# Patient Record
Sex: FEMALE | Race: WHITE | Employment: FULL TIME | URBAN - METROPOLITAN AREA
[De-identification: names, ages, dates, MRNs, and addresses within clinical notes are randomized per-mention and may not be internally consistent; named-entity substitution may affect disease eponyms.]

---

## 2019-03-12 ENCOUNTER — HOSPITAL ENCOUNTER (OUTPATIENT)
Dept: WOMENS IMAGING | Age: 64
Discharge: HOME | End: 2019-03-12
Payer: COMMERCIAL

## 2019-03-12 ENCOUNTER — HOSPITAL ENCOUNTER (OUTPATIENT)
Dept: ULTRASOUND IMAGING | Age: 64
Discharge: HOME | End: 2019-03-12
Payer: COMMERCIAL

## 2019-03-12 DIAGNOSIS — N63.0 LUMP OR MASS IN BREAST: ICD-10-CM

## 2019-03-12 DIAGNOSIS — N64.4 BREAST PAIN, LEFT: ICD-10-CM

## 2019-03-12 PROCEDURE — G0279 TOMOSYNTHESIS, MAMMO: HCPCS

## 2019-03-12 PROCEDURE — 76642 ULTRASOUND BREAST LIMITED: CPT

## 2022-01-09 ENCOUNTER — HOSPITAL ENCOUNTER (EMERGENCY)
Age: 67
Discharge: LEFT BEFORE DISCHARGE | End: 2022-01-09
Payer: COMMERCIAL

## 2022-01-09 VITALS
DIASTOLIC BLOOD PRESSURE: 75 MMHG | TEMPERATURE: 98.2 F | SYSTOLIC BLOOD PRESSURE: 128 MMHG | RESPIRATION RATE: 18 BRPM | HEART RATE: 88 BPM | OXYGEN SATURATION: 98 %

## 2022-01-09 DIAGNOSIS — Z53.21 ELOPED FROM EMERGENCY DEPARTMENT: Primary | ICD-10-CM

## 2022-01-09 PROCEDURE — 99283 EMERGENCY DEPT VISIT LOW MDM: CPT

## 2022-01-09 NOTE — ED PROVIDER NOTES
**ADVANCED PRACTICE PROVIDER, I HAVE EVALUATED THIS PATIENT**        [address] [facility]      Pt Name: Prachi Zhang  TDT:[id number]  [place] 1955  Date of evaluation: 1/9/2022  Provider: Yadiel Meek PA-C      Chief Complaint:    Chief Complaint   Patient presents with    Urinary Retention     Patient in with complaints of diff urinating. States she was dx with prolapse bladder         Nursing Notes, Past Medical Hx, Past Surgical Hx, Social Hx, Allergies, and Family Hx were all reviewed and agreed with or any disagreements were addressed in the HPI.    HPI: (Location, Duration, Timing, Severity, Quality, Assoc Sx, Context, Modifying factors)    Chief Complaint of urinary retention/difficulty urinating    This is a  77 y.o. female who presents to the emergency department stating that she has a prolapsed bladder and uterus and she has been having some difficulty urinating. She states that when she gets up in the morning after sleeping she is able to urinate however during the day becomes increasingly more difficult and she only urinates in small amounts. She has an appointment with her OB/GYN/pelvic  next Thursday. She states that over the last couple of days she has been taking care of her mother who has dementia and she has been up multiple times in the middle of the night, and states that she thinks that she is retaining urine. She states she is not having any pain, she just feels as if she is not able to urinate completely.     PastMedical/Surgical History:      Diagnosis Date    Hyperlipidemia     Hypertension     Thyroid disease          Procedure Laterality Date    JOINT REPLACEMENT      TOTAL HIP ARTHROPLASTY Bilateral        Medications:  Discharge Medication List as of 1/9/2022  2:01 PM      CONTINUE these medications which have NOT CHANGED    Details   levothyroxine (SYNTHROID) 75 MCG tablet R-1      meloxicam (MOBIC) 15 MG tablet Take 1 tablet by mouth daily For 10 days then daily when necessary thereafter, Disp-30 tablet, R-2      benazepril (LOTENSIN) 20 MG tablet Take 20 mg by mouth daily. Review of Systems:  (2-9 systems needed)  Review of Systems    \"Positives and Pertinent negatives as per HPI\"    Physical Exam:  Physical Exam  Vitals and nursing note reviewed. Constitutional:       Appearance: Normal appearance. She is well-developed. She is not ill-appearing or diaphoretic. HENT:      Head: Normocephalic and atraumatic. Right Ear: External ear normal.      Left Ear: External ear normal.      Nose: Nose normal.   Eyes:      General:         Right eye: No discharge. Left eye: No discharge. Pulmonary:      Effort: Pulmonary effort is normal. No respiratory distress. Breath sounds: No stridor. Genitourinary:     Labia:         Right: No rash, tenderness, lesion or injury. Left: No rash, tenderness, lesion or injury. Vagina: No foreign body. No vaginal discharge, erythema, tenderness or bleeding. Cervix: No cervical motion tenderness, discharge or friability. Uterus: Not tender. Adnexa:         Right: No mass, tenderness or fullness. Left: No mass, tenderness or fullness. Rectum: Normal.      Comments: Sitting and standing no palpable or visible prolapse  Musculoskeletal:         General: Normal range of motion. Cervical back: Normal range of motion and neck supple. Skin:     General: Skin is warm and dry. Coloration: Skin is not pale. Neurological:      General: No focal deficit present. Mental Status: She is alert and oriented to person, place, and time.    Psychiatric:         Mood and Affect: Mood normal.         Behavior: Behavior normal.         MEDICAL DECISION MAKING    Vitals:    Vitals:    01/09/22 1248   BP: 128/75   Pulse: 88   Resp: 18   Temp: 98.2 °F (36.8 °C)   TempSrc: Oral   SpO2: 98% LABS:  Labs Reviewed   URINE RT REFLEX TO CULTURE        Remainder of labs reviewed and were negative at this time or not returned at the time of this note. RADIOLOGY:   Non-plain film images such as CT, Ultrasound and MRI are read by the radiologist. Heike Bashir PA-C have directly visualized the radiologic plain film image(s) with the below findings:      Interpretation per the Radiologist below, if available at the time of this note:    No orders to display        No results found. MEDICAL DECISION MAKING / ED COURSE:      PROCEDURES:   Procedures    None    Patient was given:  Medications - No data to display    Taking the patient at her word we did a catheterized urine, there were only 26 cc of urine in the tube, we discontinued the Jose and I was going back to talk to the patient when I was informed by the nurse that the patient had left/eloped without a complete evaluation. We would have spoken about differential diagnosis including urinary tract infection versus interstitial cystitis, and I would have referred her to a urologist    The patient tolerated their visit well. I evaluated the patient. The physician was available for consultation as needed. The patient and / or the family were informed of the results of any tests, a time was given to answer questions, a plan was proposed and they agreed with plan. CLINICAL IMPRESSION:  No diagnosis found. DISPOSITION Eloped - Left Before Treatment Complete 01/09/2022 02:00:50 PM      PATIENT REFERRED TO:  No follow-up provider specified.     DISCHARGE MEDICATIONS:  Discharge Medication List as of 1/9/2022  2:01 PM          DISCONTINUED MEDICATIONS:  Discharge Medication List as of 1/9/2022  2:01 PM                 (Please note the MDM and HPI sections of this note were completed with a voice recognition program.  Efforts were made to edit the dictations but occasionally words are mis-transcribed.)    Electronically signed, Lizandro Santizo Shane [place], [place]  01/09/22 7203

## 2022-01-09 NOTE — ED NOTES
Pt states she needs to leave now because her mother has dementia and has no one to watch her. Provider aware.      Vargas Lindo RN  01/09/22 9339

## 2022-01-26 LAB — HPV, HIGH RISK: NOT DETECTED

## 2024-05-10 ENCOUNTER — APPOINTMENT (OUTPATIENT)
Dept: GENERAL RADIOLOGY | Age: 69
End: 2024-05-10
Payer: MEDICARE

## 2024-05-10 ENCOUNTER — HOSPITAL ENCOUNTER (EMERGENCY)
Age: 69
Discharge: HOME | End: 2024-05-10
Attending: EMERGENCY MEDICINE
Payer: MEDICARE

## 2024-05-10 ENCOUNTER — HOSPITAL ENCOUNTER (EMERGENCY)
Age: 69
Discharge: HOME | End: 2024-05-10
Payer: MEDICARE

## 2024-05-10 VITALS
TEMPERATURE: 97.8 F | SYSTOLIC BLOOD PRESSURE: 168 MMHG | HEART RATE: 72 BPM | HEIGHT: 63 IN | BODY MASS INDEX: 30.3 KG/M2 | WEIGHT: 171 LBS | DIASTOLIC BLOOD PRESSURE: 85 MMHG | OXYGEN SATURATION: 96 % | RESPIRATION RATE: 16 BRPM

## 2024-05-10 VITALS
OXYGEN SATURATION: 100 % | SYSTOLIC BLOOD PRESSURE: 159 MMHG | HEART RATE: 75 BPM | DIASTOLIC BLOOD PRESSURE: 74 MMHG | TEMPERATURE: 97.8 F | RESPIRATION RATE: 18 BRPM

## 2024-05-10 DIAGNOSIS — I10 ASYMPTOMATIC HYPERTENSION: Primary | ICD-10-CM

## 2024-05-10 DIAGNOSIS — R03.0 ELEVATED BLOOD PRESSURE READING: Primary | ICD-10-CM

## 2024-05-10 DIAGNOSIS — D72.829 LEUKOCYTOSIS, UNSPECIFIED TYPE: ICD-10-CM

## 2024-05-10 LAB
ALBUMIN SERPL-MCNC: 4.2 G/DL (ref 3.4–5)
ALBUMIN/GLOB SERPL: 1.2 {RATIO} (ref 1.1–2.2)
ALP SERPL-CCNC: 69 U/L (ref 40–129)
ALT SERPL-CCNC: 20 U/L (ref 10–40)
ANION GAP SERPL CALCULATED.3IONS-SCNC: 15 MMOL/L (ref 3–16)
AST SERPL-CCNC: 29 U/L (ref 15–37)
BACTERIA URNS QL MICRO: NORMAL /HPF
BASOPHILS # BLD: 0.1 K/UL (ref 0–0.2)
BASOPHILS NFR BLD: 0.3 %
BILIRUB SERPL-MCNC: <0.2 MG/DL (ref 0–1)
BILIRUB UR QL STRIP.AUTO: NEGATIVE
BUN SERPL-MCNC: 24 MG/DL (ref 7–20)
CALCIUM SERPL-MCNC: 9.7 MG/DL (ref 8.3–10.6)
CHLORIDE SERPL-SCNC: 102 MMOL/L (ref 99–110)
CLARITY UR: CLEAR
CO2 SERPL-SCNC: 19 MMOL/L (ref 21–32)
COLOR UR: YELLOW
CREAT SERPL-MCNC: 0.7 MG/DL (ref 0.6–1.2)
DEPRECATED RDW RBC AUTO: 14.3 % (ref 12.4–15.4)
EKG ATRIAL RATE: 66 BPM
EKG ATRIAL RATE: 75 BPM
EKG DIAGNOSIS: NORMAL
EKG DIAGNOSIS: NORMAL
EKG P AXIS: 10 DEGREES
EKG P AXIS: 9 DEGREES
EKG P-R INTERVAL: 144 MS
EKG P-R INTERVAL: 162 MS
EKG Q-T INTERVAL: 344 MS
EKG Q-T INTERVAL: 370 MS
EKG QRS DURATION: 70 MS
EKG QRS DURATION: 80 MS
EKG QTC CALCULATION (BAZETT): 384 MS
EKG QTC CALCULATION (BAZETT): 387 MS
EKG R AXIS: -3 DEGREES
EKG R AXIS: -9 DEGREES
EKG T AXIS: 48 DEGREES
EKG T AXIS: 53 DEGREES
EKG VENTRICULAR RATE: 66 BPM
EKG VENTRICULAR RATE: 75 BPM
EOSINOPHIL # BLD: 0 K/UL (ref 0–0.6)
EOSINOPHIL NFR BLD: 0 %
EPI CELLS #/AREA URNS AUTO: 2 /HPF (ref 0–5)
GFR SERPLBLD CREATININE-BSD FMLA CKD-EPI: >90 ML/MIN/{1.73_M2}
GLUCOSE BLD-MCNC: 157 MG/DL (ref 70–99)
GLUCOSE SERPL-MCNC: 109 MG/DL (ref 70–99)
GLUCOSE UR STRIP.AUTO-MCNC: NEGATIVE MG/DL
HCT VFR BLD AUTO: 39.5 % (ref 36–48)
HGB BLD-MCNC: 13.3 G/DL (ref 12–16)
HGB UR QL STRIP.AUTO: NEGATIVE
HYALINE CASTS #/AREA URNS AUTO: 0 /LPF (ref 0–8)
KETONES UR STRIP.AUTO-MCNC: NEGATIVE MG/DL
LEUKOCYTE ESTERASE UR QL STRIP.AUTO: ABNORMAL
LYMPHOCYTES # BLD: 1 K/UL (ref 1–5.1)
LYMPHOCYTES NFR BLD: 4.4 %
MCH RBC QN AUTO: 29.9 PG (ref 26–34)
MCHC RBC AUTO-ENTMCNC: 33.7 G/DL (ref 31–36)
MCV RBC AUTO: 88.9 FL (ref 80–100)
MONOCYTES # BLD: 1.1 K/UL (ref 0–1.3)
MONOCYTES NFR BLD: 4.9 %
NEUTROPHILS # BLD: 20.8 K/UL (ref 1.7–7.7)
NEUTROPHILS NFR BLD: 90.4 %
NITRITE UR QL STRIP.AUTO: NEGATIVE
PERFORMED ON: ABNORMAL
PH UR STRIP.AUTO: 6 [PH] (ref 5–8)
PLATELET # BLD AUTO: 342 K/UL (ref 135–450)
PMV BLD AUTO: 7.8 FL (ref 5–10.5)
POTASSIUM SERPL-SCNC: 5 MMOL/L (ref 3.5–5.1)
PROT SERPL-MCNC: 7.6 G/DL (ref 6.4–8.2)
PROT UR STRIP.AUTO-MCNC: NEGATIVE MG/DL
RBC # BLD AUTO: 4.44 M/UL (ref 4–5.2)
RBC CLUMPS #/AREA URNS AUTO: 0 /HPF (ref 0–4)
SODIUM SERPL-SCNC: 136 MMOL/L (ref 136–145)
SP GR UR STRIP.AUTO: 1.02 (ref 1–1.03)
TROPONIN, HIGH SENSITIVITY: 10 NG/L (ref 0–14)
UA COMPLETE W REFLEX CULTURE PNL UR: ABNORMAL
UA DIPSTICK W REFLEX MICRO PNL UR: YES
URN SPEC COLLECT METH UR: ABNORMAL
UROBILINOGEN UR STRIP-ACNC: 0.2 E.U./DL
WBC # BLD AUTO: 23 K/UL (ref 4–11)
WBC #/AREA URNS AUTO: 4 /HPF (ref 0–5)

## 2024-05-10 PROCEDURE — 93010 ELECTROCARDIOGRAM REPORT: CPT | Performed by: INTERNAL MEDICINE

## 2024-05-10 PROCEDURE — 93005 ELECTROCARDIOGRAM TRACING: CPT | Performed by: PHYSICIAN ASSISTANT

## 2024-05-10 PROCEDURE — 36415 COLL VENOUS BLD VENIPUNCTURE: CPT

## 2024-05-10 PROCEDURE — 99285 EMERGENCY DEPT VISIT HI MDM: CPT

## 2024-05-10 PROCEDURE — 93005 ELECTROCARDIOGRAM TRACING: CPT | Performed by: EMERGENCY MEDICINE

## 2024-05-10 PROCEDURE — 80053 COMPREHEN METABOLIC PANEL: CPT

## 2024-05-10 PROCEDURE — 81001 URINALYSIS AUTO W/SCOPE: CPT

## 2024-05-10 PROCEDURE — 99283 EMERGENCY DEPT VISIT LOW MDM: CPT

## 2024-05-10 PROCEDURE — 84484 ASSAY OF TROPONIN QUANT: CPT

## 2024-05-10 PROCEDURE — 71045 X-RAY EXAM CHEST 1 VIEW: CPT

## 2024-05-10 PROCEDURE — 85025 COMPLETE CBC W/AUTO DIFF WBC: CPT

## 2024-05-10 RX ORDER — CANDESARTAN 16 MG/1
16 TABLET ORAL DAILY
COMMUNITY

## 2024-05-10 ASSESSMENT — ENCOUNTER SYMPTOMS
ABDOMINAL PAIN: 0
SHORTNESS OF BREATH: 0
DIARRHEA: 0
COUGH: 0
CHEST TIGHTNESS: 0
NAUSEA: 0
DIARRHEA: 0
VOMITING: 0
RHINORRHEA: 0
WHEEZING: 0
NAUSEA: 0
ABDOMINAL PAIN: 0
VOMITING: 0
SHORTNESS OF BREATH: 0

## 2024-05-10 ASSESSMENT — PAIN - FUNCTIONAL ASSESSMENT
PAIN_FUNCTIONAL_ASSESSMENT: NONE - DENIES PAIN
PAIN_FUNCTIONAL_ASSESSMENT: 0-10

## 2024-05-10 ASSESSMENT — LIFESTYLE VARIABLES
HOW MANY STANDARD DRINKS CONTAINING ALCOHOL DO YOU HAVE ON A TYPICAL DAY: PATIENT DOES NOT DRINK
HOW OFTEN DO YOU HAVE A DRINK CONTAINING ALCOHOL: NEVER

## 2024-05-10 ASSESSMENT — PAIN SCALES - GENERAL: PAINLEVEL_OUTOF10: 0

## 2024-05-10 NOTE — ED PROVIDER NOTES
EKG  The Ekg interpreted by me shows  normal sinus rhythm with a rate of 66  Axis is   Normal  QTc is  normal  Intervals and Durations are unremarkable.      ST Segments: normal  No prior EKG available for comparison.       Jerry Gray MD  05/10/24 7599

## 2024-05-10 NOTE — ED PROVIDER NOTES
[facility] EMERGENCY DEPARTMENT  EMERGENCY DEPARTMENT ENCOUNTER        Pt Name: Myra Linton  MRN: [id number]  Birthdate 1955  Date of evaluation: 5/10/2024  Provider: JOSEPH Madrid CNP  PCP: Christine Perez MD  Note Started: 1:09 AM EDT 5/10/24       I have seen and evaluated this patient with my supervising physician Kiran Manzo MD.      CHIEF COMPLAINT       Chief Complaint   Patient presents with    Hypertension     PT states her BP was reading 170/105 at home. She took an extra dose of candesartan 16mg before coming to help. Brought in by [facility]       HISTORY OF PRESENT ILLNESS: 1 or more Elements     History from : Patient    Limitations to history : None    Myra Linton is a 68 y.o. female who presents to the emergency department with asymptomatic hypertension.  The patient reports that she had cortisone injections in her knee yesterday at [facility], this was the first time, and when she awoke this morning she noticed that her cheeks were little pink.  Think much of it and the pinkness in her cheeks did continue through the day, this evening she looked on the Internet why her cheeks might be pink and was alerted that her blood pressure might be high, prompting her to check her blood pressure.  States that her initial blood pressure was 180/105, this was concerning and she did call EMS after taking an extra dose of candesartan.  States that she took her normal dose this morning and repeat dose this evening.  EMS did arrive, she reports that they were wishy-washy about what she should do so she decided to be transported to the emergency department.  Upon arrival, her blood pressure is coming down nicely, I did recheck her pressure myself, 159/74.  As stated above, the patient is asymptomatic, specifically denies chest pain, shortness of breath, headache, or dizziness.    Nursing Notes were all reviewed and agreed with or any disagreements were  medications:  Medications - No data to display          Is this patient to be included in the SEP-1 Core Measure due to severe sepsis or septic shock?   No   Exclusion criteria - the patient is NOT to be included for SEP-1 Core Measure due to:  Infection is not suspected    CONSULTS: (Who and What was discussed)  None  Discussion with Other Profesionals : None    Social Determinants : Patient has significant healthcare illiteracy    Records Reviewed : Outpatient Notes dr. Su, primary care, chronic pain right knee, physical therapy    CC/HPI Summary, DDx, ED Course, and Reassessment:     Briefly, this is a 68 year old female who presents to the emergency department with asymptomatic hypertension.  The patient reports that she had cortisone injections in her knee yesterday at [facility], this was the first time, and when she awoke this morning she noticed that her cheeks were little pink.  Think much of it and the pinkness in her cheeks did continue through the day, this evening she looked on the Internet why her cheeks might be pink and was alerted that her blood pressure might be high, prompting her to check her blood pressure.  States that her initial blood pressure was 180/105, this was concerning and she did call EMS after taking an extra dose of candesartan.  States that she took her normal dose this morning and repeat dose this evening.  EMS did arrive, she reports that they were wishy-washy about what she should do so she decided to be transported to the emergency department.  Upon arrival, her blood pressure is coming down nicely, I did recheck her pressure myself, 159/74.  As stated above, the patient is asymptomatic, specifically denies chest pain, shortness of breath, headache, or dizziness.    Patient will follow up with primary care tomorrow as needed tomorrow.  Nothing more to do tonight.  She was given appropriate reassurance, benign exam and reassuring vital signs were reviewed with

## 2024-05-10 NOTE — ED PROVIDER NOTES
[facility] EMERGENCY DEPARTMENT  EMERGENCY DEPARTMENT ENCOUNTER        Pt Name: Myra Linton  MRN: [id number]  Birthdate 1955  Date of evaluation: 5/10/2024  Provider: Nina Briones PA-C  PCP: Christine Perez MD  Note Started: 1:46 PM EDT 5/10/24      ARTEMIO. I have evaluated this patient.        CHIEF COMPLAINT       Chief Complaint   Patient presents with    Hypertension     Pt to ED after being referred by PCP for elevated b/p readings. PT seen here for same last night. PT sts \"I just got cortisone injections to both knees\". PT sts \"I'm not sure if it is related\".     Referral - General       HISTORY OF PRESENT ILLNESS: 1 or more Elements     History From: patient   Limitations to history : None    Myra Linton is a 68 y.o. female who presents for reevaluation of elevated blood pressure reading.  Patient has a history of hypertension and diabetes.  She has been on candesartan for the past couple of years and states that her blood pressure is normally around 120 systolic.  Most recent PCP appointment was about 2 to 3 weeks ago.  States that she got cortisone injections into her knees a couple of days ago and woke up yesterday morning with flushed cheeks.  States that she was concerned that it was secondary to elevated blood pressure and when she checked her blood pressure noticed it was 188 systolic.  States that she called 911 and came to the ER last night.  She had taken an extra dose of blood pressure medication and at time of discharge systolic was down to 159.  She is also slightly hyperglycemic.  She is concerned that this could be related to the cortisone injections.  She talked her PCP today and they recommended her come back to the ER.  States that her blood pressure was 168 at [employer] and she bought a new blood pressure cuff.  States when she got home and took it was 188.  She is concerned that her blood pressure cuff may be reading high.  States that her cheeks are still a  the dictations but occasionally words are mis-transcribed.)    Nina Briones PA-C (electronically signed)            Nina Briones PA-C  05/10/24 6797

## 2024-05-10 NOTE — ED PROVIDER NOTES
I personally saw the patient and made/approved the management plan and take responsibility for the patient management.    For further details of Myra Linton's emergency department encounter, please see the advanced practice provider's documentation.    I, Kiran Manzo MD, am the primary physician provider of record.    CHIEF COMPLAINT  Chief Complaint   Patient presents with    Hypertension     PT states her BP was reading 170/105 at home. She took an extra dose of candesartan 16mg before coming to help. Brought in by  EMS       Briefly, Myra Linton is a 68 y.o. female  who presents to the ED complaining of concern for hypertension.    She has bilateral chronic knee pain and recently had cortisone injections in them.  Regarding her high blood pressure at home she apparently did take an extra dose of her blood pressure medication, candesartan.  Her blood pressure was in the 180s at home.  On recheck it was 170s however she lives alone and was worried and wanted to come in for evaluation.  At no point has she had chest pain or shortness of breath headache numbness weakness or tingling anywhere.  She does noted that she was a little more flushed in her cheeks so she was concerned about her blood pressure after looking her symptoms up online.    FOCUSED PHYSICAL EXAMINATION  BP (!) 159/74   Pulse 75   Temp 97.8 °F (36.6 °C) (Oral)   Resp 18   SpO2 100%    Focused physical examination notable for no acute distress, well-appearing, well-nourished, normal speech and mentation without obvious facial droop, no obvious rash.  No obvious cranial nerve deficits on my initial exam.  Regular rate and rhythm clear to auscultation bilaterally.      EKG performed in ED:  The 12 lead EKG was interpreted by me independent of a cardiologist as follows:  Rate: normal with a rate of 75  Rhythm: sinus  Axis: normal  Intervals: normal LA, narrow QRS, normal QTc  ST segments: no ST elevations or depressions  T waves:

## 2024-09-25 ENCOUNTER — HOSPITAL ENCOUNTER (EMERGENCY)
Age: 69
Discharge: LEFT BEFORE DISCHARGE | End: 2024-09-25

## 2025-03-18 ENCOUNTER — APPOINTMENT (OUTPATIENT)
Dept: CT IMAGING | Age: 70
End: 2025-03-18
Payer: MEDICARE

## 2025-03-18 ENCOUNTER — HOSPITAL ENCOUNTER (EMERGENCY)
Age: 70
Discharge: HOME | End: 2025-03-18
Attending: EMERGENCY MEDICINE
Payer: MEDICARE

## 2025-03-18 VITALS
SYSTOLIC BLOOD PRESSURE: 134 MMHG | HEART RATE: 88 BPM | BODY MASS INDEX: 27.11 KG/M2 | OXYGEN SATURATION: 99 % | DIASTOLIC BLOOD PRESSURE: 67 MMHG | HEIGHT: 63 IN | RESPIRATION RATE: 18 BRPM | WEIGHT: 153 LBS | TEMPERATURE: 99 F

## 2025-03-18 DIAGNOSIS — K57.32 DIVERTICULITIS OF COLON: Primary | ICD-10-CM

## 2025-03-18 LAB
ALBUMIN SERPL-MCNC: 4.2 G/DL (ref 3.4–5)
ALBUMIN/GLOB SERPL: 1.4 {RATIO} (ref 1.1–2.2)
ALP SERPL-CCNC: 87 U/L (ref 40–129)
ALT SERPL-CCNC: 17 U/L (ref 10–40)
ANION GAP SERPL CALCULATED.3IONS-SCNC: 11 MMOL/L (ref 3–16)
AST SERPL-CCNC: 17 U/L (ref 15–37)
BACTERIA URNS QL MICRO: ABNORMAL /HPF
BASOPHILS # BLD: 0.3 K/UL (ref 0–0.2)
BASOPHILS NFR BLD: 1.5 %
BILIRUB SERPL-MCNC: 0.6 MG/DL (ref 0–1)
BILIRUB UR QL STRIP.AUTO: NEGATIVE
BUN SERPL-MCNC: 12 MG/DL (ref 7–20)
CALCIUM SERPL-MCNC: 9.2 MG/DL (ref 8.3–10.6)
CHLORIDE SERPL-SCNC: 100 MMOL/L (ref 99–110)
CLARITY UR: CLEAR
CO2 SERPL-SCNC: 26 MMOL/L (ref 21–32)
COLOR UR: YELLOW
CREAT SERPL-MCNC: 0.8 MG/DL (ref 0.6–1.2)
DEPRECATED RDW RBC AUTO: 13.4 % (ref 12.4–15.4)
EOSINOPHIL # BLD: 0.1 K/UL (ref 0–0.6)
EOSINOPHIL NFR BLD: 0.7 %
EPI CELLS #/AREA URNS AUTO: 9 /HPF (ref 0–5)
GFR SERPLBLD CREATININE-BSD FMLA CKD-EPI: 79 ML/MIN/{1.73_M2}
GLUCOSE SERPL-MCNC: 95 MG/DL (ref 70–99)
GLUCOSE UR STRIP.AUTO-MCNC: NEGATIVE MG/DL
HCT VFR BLD AUTO: 38.3 % (ref 36–48)
HGB BLD-MCNC: 12.7 G/DL (ref 12–16)
HGB UR QL STRIP.AUTO: NEGATIVE
HYALINE CASTS #/AREA URNS AUTO: 0 /LPF (ref 0–8)
KETONES UR STRIP.AUTO-MCNC: NEGATIVE MG/DL
LACTATE BLDV-SCNC: 0.8 MMOL/L (ref 0.4–1.9)
LEUKOCYTE ESTERASE UR QL STRIP.AUTO: ABNORMAL
LIPASE SERPL-CCNC: 22 U/L (ref 13–60)
LYMPHOCYTES # BLD: 1.6 K/UL (ref 1–5.1)
LYMPHOCYTES NFR BLD: 8.3 %
MCH RBC QN AUTO: 29.1 PG (ref 26–34)
MCHC RBC AUTO-ENTMCNC: 33.1 G/DL (ref 31–36)
MCV RBC AUTO: 87.9 FL (ref 80–100)
MONOCYTES # BLD: 1.9 K/UL (ref 0–1.3)
MONOCYTES NFR BLD: 9.6 %
NEUTROPHILS # BLD: 15.9 K/UL (ref 1.7–7.7)
NEUTROPHILS NFR BLD: 79.9 %
NITRITE UR QL STRIP.AUTO: NEGATIVE
PH UR STRIP.AUTO: 5.5 [PH] (ref 5–8)
PLATELET # BLD AUTO: 318 K/UL (ref 135–450)
PMV BLD AUTO: 8 FL (ref 5–10.5)
POTASSIUM SERPL-SCNC: 4 MMOL/L (ref 3.5–5.1)
PROT SERPL-MCNC: 7.3 G/DL (ref 6.4–8.2)
PROT UR STRIP.AUTO-MCNC: NEGATIVE MG/DL
RBC # BLD AUTO: 4.36 M/UL (ref 4–5.2)
RBC CLUMPS #/AREA URNS AUTO: 1 /HPF (ref 0–4)
SODIUM SERPL-SCNC: 137 MMOL/L (ref 136–145)
SP GR UR STRIP.AUTO: 1.01 (ref 1–1.03)
UA COMPLETE W REFLEX CULTURE PNL UR: YES
UA DIPSTICK W REFLEX MICRO PNL UR: YES
URN SPEC COLLECT METH UR: ABNORMAL
UROBILINOGEN UR STRIP-ACNC: 0.2 E.U./DL
WBC # BLD AUTO: 19.8 K/UL (ref 4–11)
WBC #/AREA URNS AUTO: 26 /HPF (ref 0–5)

## 2025-03-18 PROCEDURE — 81001 URINALYSIS AUTO W/SCOPE: CPT

## 2025-03-18 PROCEDURE — 96374 THER/PROPH/DIAG INJ IV PUSH: CPT

## 2025-03-18 PROCEDURE — 96375 TX/PRO/DX INJ NEW DRUG ADDON: CPT

## 2025-03-18 PROCEDURE — 2580000003 HC RX 258: Performed by: PHYSICIAN ASSISTANT

## 2025-03-18 PROCEDURE — 6370000000 HC RX 637 (ALT 250 FOR IP): Performed by: PHYSICIAN ASSISTANT

## 2025-03-18 PROCEDURE — 80053 COMPREHEN METABOLIC PANEL: CPT

## 2025-03-18 PROCEDURE — 74177 CT ABD & PELVIS W/CONTRAST: CPT

## 2025-03-18 PROCEDURE — 85025 COMPLETE CBC W/AUTO DIFF WBC: CPT

## 2025-03-18 PROCEDURE — 87086 URINE CULTURE/COLONY COUNT: CPT

## 2025-03-18 PROCEDURE — 83690 ASSAY OF LIPASE: CPT

## 2025-03-18 PROCEDURE — 83605 ASSAY OF LACTIC ACID: CPT

## 2025-03-18 PROCEDURE — 6360000002 HC RX W HCPCS: Performed by: PHYSICIAN ASSISTANT

## 2025-03-18 PROCEDURE — 99285 EMERGENCY DEPT VISIT HI MDM: CPT

## 2025-03-18 PROCEDURE — 6360000004 HC RX CONTRAST MEDICATION: Performed by: EMERGENCY MEDICINE

## 2025-03-18 RX ORDER — HYDROCODONE BITARTRATE AND ACETAMINOPHEN 5; 325 MG/1; MG/1
1 TABLET ORAL EVERY 4 HOURS PRN
Qty: 18 TABLET | Refills: 0 | Status: SHIPPED | OUTPATIENT
Start: 2025-03-18 | End: 2025-03-21

## 2025-03-18 RX ORDER — ONDANSETRON 2 MG/ML
4 INJECTION INTRAMUSCULAR; INTRAVENOUS EVERY 6 HOURS PRN
Status: DISCONTINUED | OUTPATIENT
Start: 2025-03-18 | End: 2025-03-19 | Stop reason: HOSPADM

## 2025-03-18 RX ORDER — IOPAMIDOL 755 MG/ML
75 INJECTION, SOLUTION INTRAVASCULAR
Status: COMPLETED | OUTPATIENT
Start: 2025-03-18 | End: 2025-03-18

## 2025-03-18 RX ORDER — ONDANSETRON 4 MG/1
4 TABLET, FILM COATED ORAL EVERY 8 HOURS PRN
Qty: 20 TABLET | Refills: 0 | Status: SHIPPED | OUTPATIENT
Start: 2025-03-18

## 2025-03-18 RX ORDER — FENTANYL CITRATE 50 UG/ML
25 INJECTION, SOLUTION INTRAMUSCULAR; INTRAVENOUS ONCE
Refills: 0 | Status: COMPLETED | OUTPATIENT
Start: 2025-03-18 | End: 2025-03-18

## 2025-03-18 RX ORDER — 0.9 % SODIUM CHLORIDE 0.9 %
1000 INTRAVENOUS SOLUTION INTRAVENOUS ONCE
Status: COMPLETED | OUTPATIENT
Start: 2025-03-18 | End: 2025-03-18

## 2025-03-18 RX ADMIN — FENTANYL CITRATE 25 MCG: 50 INJECTION INTRAMUSCULAR; INTRAVENOUS at 21:06

## 2025-03-18 RX ADMIN — SODIUM CHLORIDE 1000 ML: 0.9 INJECTION, SOLUTION INTRAVENOUS at 21:06

## 2025-03-18 RX ADMIN — AMOXICILLIN AND CLAVULANATE POTASSIUM 1 TABLET: 875; 125 TABLET, FILM COATED ORAL at 22:59

## 2025-03-18 RX ADMIN — ONDANSETRON 4 MG: 2 INJECTION, SOLUTION INTRAMUSCULAR; INTRAVENOUS at 21:05

## 2025-03-18 RX ADMIN — IOPAMIDOL 75 ML: 755 INJECTION, SOLUTION INTRAVENOUS at 20:31

## 2025-03-18 ASSESSMENT — PAIN DESCRIPTION - ORIENTATION
ORIENTATION: MID
ORIENTATION: RIGHT;LEFT;LOWER

## 2025-03-18 ASSESSMENT — ENCOUNTER SYMPTOMS
NAUSEA: 0
COUGH: 0
RHINORRHEA: 0
VOMITING: 0
SHORTNESS OF BREATH: 0
ABDOMINAL PAIN: 1
DIARRHEA: 1
BLOOD IN STOOL: 1

## 2025-03-18 ASSESSMENT — PAIN SCALES - GENERAL
PAINLEVEL_OUTOF10: 3
PAINLEVEL_OUTOF10: 4

## 2025-03-18 ASSESSMENT — PAIN DESCRIPTION - LOCATION
LOCATION: ABDOMEN
LOCATION: ABDOMEN

## 2025-03-18 ASSESSMENT — PAIN - FUNCTIONAL ASSESSMENT: PAIN_FUNCTIONAL_ASSESSMENT: 0-10

## 2025-03-18 ASSESSMENT — PAIN DESCRIPTION - DESCRIPTORS: DESCRIPTORS: ACHING

## 2025-03-18 ASSESSMENT — PAIN DESCRIPTION - PAIN TYPE: TYPE: ACUTE PAIN

## 2025-03-19 LAB — BACTERIA UR CULT: NORMAL

## 2025-03-19 NOTE — ED PROVIDER NOTES
In addition to the advanced practice provider, I personally saw Myra Linton and performed a substantive portion of the visit including all aspects of the medical decision making.    Medical Decision Making  Patient seen and evaluated at bedside.  Briefly, she is presenting with nausea and left lower quadrant abdominal pain.  Lab workup significant for leukocytosis of 19.8 which I suspect is in part due to her recent oral steroid use.  CT abdomen/pelvis shows findings consistent with sigmoid colon diverticulitis.  Patient notified of her lab and imaging results.  She remained hemodynamically stable in the ED.  She decision-making discussion utilized with patient and she would like to continue treatment at home if possible.  I believe this is reasonable.  Patient was given a dose of Augmentin in the ED for diverticulitis.  She was provided with prescriptions for Augmentin, Norco and Zofran.  She was given ED return precautions.  She is stable for discharge.    EKG  N/A    SEP-1  Is this patient to be included in the SEP-1 Core Measure due to severe sepsis or septic shock?   No     Exclusion criteria - the patient is NOT to be included for SEP-1 Core Measure due to:  May have criteria for sepsis, but does not meet criteria for severe sepsis or septic shock    Screenings     [place] Coma Scale  Eye Opening: Spontaneous  Best Verbal Response: Oriented  Best Motor Response: Obeys commands  [place] Coma Scale Score: 15             Patient Referrals:  Nieves Su MD  [address] [facility] [address] [phone]    Schedule an appointment as soon as possible for a visit in 2 days      [facility] Emergency Department  [address] [facility] [address] [phone]    As needed, If symptoms worsen      Discharge Medications:  Discharge Medication List as of 3/18/2025 11:03 PM        START taking these medications    Details   amoxicillin-clavulanate (AUGMENTIN) 875-125 MG per tablet Take 1 
(AUGMENTIN) 875-125 MG per tablet Take 1 tablet by mouth 2 times daily for 7 days, Disp-14 tablet, R-0Normal      HYDROcodone-acetaminophen (NORCO) 5-325 MG per tablet Take 1 tablet by mouth every 4 hours as needed for Pain for up to 3 days. Intended supply: 3 days. Take lowest dose possible to manage pain Max Daily Amount: 6 tablets, Disp-18 tablet, R-0Normal      ondansetron (ZOFRAN) 4 MG tablet Take 1 tablet by mouth every 8 hours as needed for Nausea, Disp-20 tablet, R-0Normal             DISCONTINUED MEDICATIONS:  Discharge Medication List as of 3/18/2025 11:03 PM                 (Please note that portions of this note were completed with a voice recognition program.  Efforts were made to edit the dictations but occasionally words are mis-transcribed.)    Ileana Mullen PA-C (electronically signed)        Ileana Mullen PA-C  03/19/25 0111

## 2025-03-19 NOTE — ED NOTES
Pt to ED states had Flu A about 2 weeks ago and saw her doctor and got started on tamiflu and a rescue inhaler, some pain worsening from her cough, but the last two days has been having bright red blood and mucus in stool and here for eval. AAOx4, NAD, resp e/u on RA, bed locked lowest position, rails up x2, call light within reach, placed on BP cuff and Pulse ox, has 22g IV to R AC c/d/i

## 2025-04-16 ENCOUNTER — APPOINTMENT (OUTPATIENT)
Dept: CT IMAGING | Age: 70
End: 2025-04-16
Payer: MEDICARE

## 2025-04-16 ENCOUNTER — HOSPITAL ENCOUNTER (EMERGENCY)
Age: 70
Discharge: HOME | End: 2025-04-16
Attending: EMERGENCY MEDICINE
Payer: MEDICARE

## 2025-04-16 VITALS
SYSTOLIC BLOOD PRESSURE: 188 MMHG | DIASTOLIC BLOOD PRESSURE: 102 MMHG | RESPIRATION RATE: 14 BRPM | WEIGHT: 154 LBS | HEART RATE: 81 BPM | HEIGHT: 63 IN | TEMPERATURE: 98.7 F | OXYGEN SATURATION: 95 % | BODY MASS INDEX: 27.29 KG/M2

## 2025-04-16 DIAGNOSIS — K57.32 DIVERTICULITIS OF COLON: Primary | ICD-10-CM

## 2025-04-16 DIAGNOSIS — N39.0 URINARY TRACT INFECTION WITHOUT HEMATURIA, SITE UNSPECIFIED: ICD-10-CM

## 2025-04-16 LAB
BACTERIA URNS QL MICRO: ABNORMAL /HPF
BILIRUB UR QL STRIP.AUTO: NEGATIVE
CLARITY UR: ABNORMAL
COLOR UR: ABNORMAL
EPI CELLS #/AREA URNS AUTO: 11 /HPF (ref 0–5)
GLUCOSE UR STRIP.AUTO-MCNC: NEGATIVE MG/DL
HGB UR QL STRIP.AUTO: ABNORMAL
HYALINE CASTS #/AREA URNS AUTO: 1 /LPF (ref 0–8)
KETONES UR STRIP.AUTO-MCNC: 15 MG/DL
LEUKOCYTE ESTERASE UR QL STRIP.AUTO: ABNORMAL
NITRITE UR QL STRIP.AUTO: POSITIVE
PH UR STRIP.AUTO: 5.5 [PH] (ref 5–8)
PROT UR STRIP.AUTO-MCNC: 300 MG/DL
RBC CLUMPS #/AREA URNS AUTO: 971 /HPF (ref 0–4)
SP GR UR STRIP.AUTO: 1.01 (ref 1–1.03)
UA COMPLETE W REFLEX CULTURE PNL UR: YES
UA DIPSTICK W REFLEX MICRO PNL UR: YES
URN SPEC COLLECT METH UR: ABNORMAL
UROBILINOGEN UR STRIP-ACNC: 1 E.U./DL
WBC #/AREA URNS AUTO: 1698 /HPF (ref 0–5)

## 2025-04-16 PROCEDURE — 87086 URINE CULTURE/COLONY COUNT: CPT

## 2025-04-16 PROCEDURE — 87077 CULTURE AEROBIC IDENTIFY: CPT

## 2025-04-16 PROCEDURE — 99284 EMERGENCY DEPT VISIT MOD MDM: CPT

## 2025-04-16 PROCEDURE — 87186 SC STD MICRODIL/AGAR DIL: CPT

## 2025-04-16 PROCEDURE — 74176 CT ABD & PELVIS W/O CONTRAST: CPT

## 2025-04-16 PROCEDURE — 81001 URINALYSIS AUTO W/SCOPE: CPT

## 2025-04-16 PROCEDURE — 6370000000 HC RX 637 (ALT 250 FOR IP): Performed by: EMERGENCY MEDICINE

## 2025-04-16 RX ORDER — CIPROFLOXACIN 500 MG/1
500 TABLET, FILM COATED ORAL 2 TIMES DAILY
Qty: 14 TABLET | Refills: 0 | Status: SHIPPED | OUTPATIENT
Start: 2025-04-16 | End: 2025-04-23

## 2025-04-16 RX ORDER — CIPROFLOXACIN 500 MG/1
500 TABLET, FILM COATED ORAL ONCE
Status: COMPLETED | OUTPATIENT
Start: 2025-04-16 | End: 2025-04-16

## 2025-04-16 RX ORDER — METRONIDAZOLE 250 MG/1
500 TABLET ORAL ONCE
Status: COMPLETED | OUTPATIENT
Start: 2025-04-16 | End: 2025-04-16

## 2025-04-16 RX ORDER — METRONIDAZOLE 500 MG/1
500 TABLET ORAL 2 TIMES DAILY
Qty: 14 TABLET | Refills: 0 | Status: SHIPPED | OUTPATIENT
Start: 2025-04-16 | End: 2025-04-23

## 2025-04-16 RX ADMIN — METRONIDAZOLE 500 MG: 250 TABLET ORAL at 20:03

## 2025-04-16 RX ADMIN — CIPROFLOXACIN HYDROCHLORIDE 500 MG: 500 TABLET, FILM COATED ORAL at 20:03

## 2025-04-16 ASSESSMENT — PAIN SCALES - GENERAL: PAINLEVEL_OUTOF10: 4

## 2025-04-16 ASSESSMENT — PAIN DESCRIPTION - PAIN TYPE: TYPE: ACUTE PAIN

## 2025-04-16 ASSESSMENT — PAIN DESCRIPTION - LOCATION: LOCATION: ABDOMEN

## 2025-04-16 ASSESSMENT — PAIN - FUNCTIONAL ASSESSMENT: PAIN_FUNCTIONAL_ASSESSMENT: 0-10

## 2025-04-16 NOTE — ED PROVIDER NOTES
Emergency Department Encounter    Patient: Myra Linton  MRN: [id number]  : 1955  Date of Evaluation: 2025  ED Provider:  JAYDEN ROPER MD    Triage Chief Complaint:   Abdominal Pain (All over abd pain, vaginal prolapse, unable to have a bowel movement states the only thing come out is mucous )    [place]:  Myra Linton is a 69 y.o. female that presents to the ER for valuation of abdominal pain left lower quadrant recent diverticulitis with persistent abdominal pain mucus discharge.  Recent pelvic floor weakness and seen by urogyn.  Positive questionable dysuria.  Generalized feeling of unwellness.  No vaginal bleeding or discharge    ROS - see HPI, below listed is current ROS at time of my eval:  General:  No fevers, no chills, no weakness  Eyes:  no discharge  ENT:  No sore throat, no nasal congestion  Cardiovascular:  No chest pain, no palpitations  Respiratory:  No shortness of breath, no cough, no wheezing  Gastrointestinal:  + pain, + nausea, no vomiting, no diarrhea  Musculoskeletal:  No muscle pain, no joint pain  Skin:  No rash, no pruritis  Neurologic:  no headache  Genitourinary:  No dysuria, no hematuria  Endocrine:  No unexpected weight gain, no unexpected weight loss  Extremities:  no edema, no pain    Past Medical History:   Diagnosis Date    Diverticulosis     Hyperlipidemia     Hypertension     Thyroid disease      Past Surgical History:   Procedure Laterality Date    JOINT REPLACEMENT      TOTAL HIP ARTHROPLASTY Bilateral      Family History   Problem Relation Age of Onset    High Blood Pressure Mother     Cancer Father         lung     Social History     Socioeconomic History    Marital status:      Spouse name: Not on file    Number of children: Not on file    Years of education: Not on file    Highest education level: Not on file   Occupational History    Not on file   Tobacco Use    Smoking status: Never    Smokeless tobacco: Never   Substance and Sexual Activity

## 2025-04-18 LAB
BACTERIA UR CULT: ABNORMAL
ORGANISM: ABNORMAL

## 2025-04-18 NOTE — ED NOTES
Attempted to call patient re: urine culture, left HIPAA-compliant voice mail    Natalee Patel, AliciaD, BCCCP

## 2025-04-19 NOTE — ED NOTES
[facility]   Emergency Department Culture Follow-Up       Myra Linton (CSN: [id number]) was seen and evaluated at [facility] Emergency Department on 4/16 by provider Dr. Francisco.    A urine culture was positive and is growing >100k E coli. Sensitivity results: resistant to ciprofloxacin and levofloxacin; sensitive to all others      Treatment Course:    The patient was treated and discharged with ciprofloxacin and metronidazole (for concomitant diverticulitis coverage).      Recommendation:    It is recommended to discontinue both cipro and metronidazole and start Augmentin 875-125mg PO BID 10 days.    This recommendation was reviewed with and agreed by ED provider Dr. Manzo.    Follow-Up:    The patient was contacted and notified of the therapy change. The new prescription was called to [facility] on date: 4/19/25 by: Natalee Patel. Pharmacy address: [address] [place] [address] phone number: [phone]. Patient v/u with plan above.    Thank you,    Natalee Patel [facility]  4/19/2025